# Patient Record
Sex: FEMALE | Race: WHITE | NOT HISPANIC OR LATINO | URBAN - METROPOLITAN AREA
[De-identification: names, ages, dates, MRNs, and addresses within clinical notes are randomized per-mention and may not be internally consistent; named-entity substitution may affect disease eponyms.]

---

## 2024-05-28 ENCOUNTER — TELEPHONE (OUTPATIENT)
Dept: URGENT CARE | Facility: CLINIC | Age: 39
End: 2024-05-28

## 2024-05-28 ENCOUNTER — OFFICE VISIT (OUTPATIENT)
Dept: URGENT CARE | Facility: CLINIC | Age: 39
End: 2024-05-28
Payer: COMMERCIAL

## 2024-05-28 VITALS
SYSTOLIC BLOOD PRESSURE: 126 MMHG | WEIGHT: 195 LBS | OXYGEN SATURATION: 98 % | HEIGHT: 64 IN | RESPIRATION RATE: 20 BRPM | DIASTOLIC BLOOD PRESSURE: 80 MMHG | BODY MASS INDEX: 33.29 KG/M2 | HEART RATE: 75 BPM | TEMPERATURE: 97.7 F

## 2024-05-28 DIAGNOSIS — R06.02 SHORTNESS OF BREATH: Primary | ICD-10-CM

## 2024-05-28 LAB
ATRIAL RATE: 71 BPM
ATRIAL RATE: 77 BPM
P AXIS: 37 DEGREES
P AXIS: 6 DEGREES
PR INTERVAL: 148 MS
PR INTERVAL: 158 MS
QRS AXIS: 66 DEGREES
QRS AXIS: 67 DEGREES
QRSD INTERVAL: 104 MS
QRSD INTERVAL: 106 MS
QT INTERVAL: 404 MS
QT INTERVAL: 414 MS
QTC INTERVAL: 449 MS
QTC INTERVAL: 457 MS
T WAVE AXIS: 35 DEGREES
T WAVE AXIS: 40 DEGREES
VENTRICULAR RATE: 71 BPM
VENTRICULAR RATE: 77 BPM

## 2024-05-28 PROCEDURE — 93010 ELECTROCARDIOGRAM REPORT: CPT | Performed by: INTERNAL MEDICINE

## 2024-05-28 RX ORDER — ALBUTEROL SULFATE 90 UG/1
2 AEROSOL, METERED RESPIRATORY (INHALATION) EVERY 6 HOURS PRN
Qty: 18 G | Refills: 0 | Status: SHIPPED | OUTPATIENT
Start: 2024-05-28

## 2024-05-28 RX ORDER — ALBUTEROL SULFATE 2.5 MG/3ML
2.5 SOLUTION RESPIRATORY (INHALATION) EVERY 6 HOURS PRN
COMMUNITY

## 2024-05-28 NOTE — PROGRESS NOTES
[facility]        NAME: Safia Cowan is a 38 y.o. female  : 1985    MRN: [id number]  DATE: May 28, 2024  TIME: 10:14 AM    Assessment and Plan   Shortness of breath [R06.02]  1. Shortness of breath  ECG 12 lead            Patient Instructions     Patient feels that she is having an asthma flare. No signs of wheezing on exam. O2% is 98%. Feels that her chest is heavy. Symptoms worse with ambulation and lying down. Concerned for possible PE vs cardiac etiology. EKG was completed in the office today. NSR with rate of 75. Recommend transfer to ED for further work up. Patient requested to travel by private vehicle. Going to [facility].    If tests have been performed at [facility], our office will contact you with results if changes need to be made to the care plan discussed with you at the visit.  You can review your full results on [facility].    Chief Complaint     Chief Complaint   Patient presents with   • Shortness of Breath     Pt here ill less the n 24 hours, pt states shortness of breath,  tightness and pressure in chest, can't take a deep breath, and cough. No fever.  Pt has done 4 neb treatment  over night. Pt used Albuterol .          History of Present Illness       Shortness of Breath  The current episode started yesterday. The problem occurs constantly. The problem has been gradually worsening since onset. The problem is moderate. Associated symptoms include chest pressure and wheezing. Pertinent negatives include no chest pain, coughing, dizziness, palpitations or sore throat. Past treatments include beta-agonist inhalers. The treatment provided no relief.     Symptoms worse with ambulation and lying supine. Notes that she slept upright all night.    Review of Systems   Review of Systems   Constitutional:  Negative for chills and fever.   HENT:  Negative for congestion, ear pain, postnasal drip, sinus pain and sore throat.    Eyes:  Negative for pain and itching.  "  Respiratory:  Positive for shortness of breath and wheezing. Negative for cough.    Cardiovascular:  Negative for chest pain and palpitations.   Gastrointestinal:  Negative for abdominal pain, constipation, diarrhea, nausea and vomiting.   Genitourinary:  Negative for difficulty urinating and hematuria.   Musculoskeletal:  Negative for arthralgias and myalgias.   Skin:  Negative for rash.   Neurological:  Negative for dizziness, light-headedness and headaches.   Psychiatric/Behavioral:  Negative for agitation and sleep disturbance. The patient is not nervous/anxious.          Current Medications       Current Outpatient Medications:   •  albuterol (2.5 mg/3 mL) 0.083 % nebulizer solution, Take 2.5 mg by nebulization every 6 (six) hours as needed for wheezing, Disp: , Rfl:     Current Allergies     Allergies as of 05/28/2024 - Reviewed 05/28/2024   Allergen Reaction Noted   • Promethazine Shortness Of Breath 05/28/2024            The following portions of the patient's history were reviewed and updated as appropriate: allergies, current medications, past family history, past medical history, past social history, past surgical history and problem list.     Past Medical History:   Diagnosis Date   • Asthma        Past Surgical History:   Procedure Laterality Date   • NO PAST SURGERIES         History reviewed. No pertinent family history.      Medications have been verified.        Objective   /80   Pulse 75   Temp 97.7 °F (36.5 °C)   Resp 20   Ht 5' 4\" (1.626 m)   Wt 88.5 kg (195 lb)   SpO2 98%   BMI 33.47 kg/m²   No LMP recorded.       Physical Exam     Physical Exam  Vitals reviewed.   Constitutional:       General: She is not in acute distress.     Appearance: Normal appearance. She is not ill-appearing.   HENT:      Head: Normocephalic and atraumatic.   Eyes:      Extraocular Movements: Extraocular movements intact.      Conjunctiva/sclera: Conjunctivae normal.   Cardiovascular:      Rate and " Rhythm: Normal rate and regular rhythm.      Pulses: Normal pulses.      Heart sounds: Normal heart sounds. No murmur heard.  Pulmonary:      Effort: Pulmonary effort is normal.      Breath sounds: Normal breath sounds and air entry. No decreased air movement.   Skin:     General: Skin is warm.   Neurological:      General: No focal deficit present.      Mental Status: She is alert.   Psychiatric:         Mood and Affect: Mood normal.         Behavior: Behavior normal.         Judgment: Judgment normal.       EKG: NSR with rate 75 bpm.

## 2024-05-28 NOTE — TELEPHONE ENCOUNTER
Patient's  called upset and arguing with my recommendation that she should be seen in the ED for her positional dyspnea. He is requesting that a new rx for an inhaler be sent, as he believes this is what she needs, despite no improvement over the past 24 hours. He explained that and this same treatment worked for him and his daughter. I tried to explain that positional dyspnea can be caused by other conditions and that she should be seen in the ED. However, I will refill the inhaler at this time. Work note given for today. His wife stated that they would still be presenting to the ED.

## 2024-05-28 NOTE — LETTER
To whom it may concern,      Safia Cowan was seen in my office on 05/28/24. She may return to work tomorrow. Thank you!      Sincerely,    Rusty Castillo, DO